# Patient Record
Sex: MALE | Race: WHITE
[De-identification: names, ages, dates, MRNs, and addresses within clinical notes are randomized per-mention and may not be internally consistent; named-entity substitution may affect disease eponyms.]

---

## 2017-06-05 ENCOUNTER — HOSPITAL ENCOUNTER (OUTPATIENT)
Dept: HOSPITAL 74 - FASU-ENDO | Age: 27
Discharge: HOME | End: 2017-06-05
Attending: INTERNAL MEDICINE
Payer: COMMERCIAL

## 2017-06-05 VITALS — TEMPERATURE: 97.8 F

## 2017-06-05 VITALS — BODY MASS INDEX: 26.6 KG/M2

## 2017-06-05 VITALS — SYSTOLIC BLOOD PRESSURE: 110 MMHG | HEART RATE: 64 BPM | DIASTOLIC BLOOD PRESSURE: 64 MMHG

## 2017-06-05 DIAGNOSIS — R10.9: Primary | ICD-10-CM

## 2017-06-05 PROCEDURE — 0DB98ZX EXCISION OF DUODENUM, VIA NATURAL OR ARTIFICIAL OPENING ENDOSCOPIC, DIAGNOSTIC: ICD-10-PCS | Performed by: INTERNAL MEDICINE

## 2017-06-05 PROCEDURE — 0DB68ZX EXCISION OF STOMACH, VIA NATURAL OR ARTIFICIAL OPENING ENDOSCOPIC, DIAGNOSTIC: ICD-10-PCS | Performed by: INTERNAL MEDICINE

## 2017-06-06 NOTE — PATH
Surgical Pathology Report



Patient Name:  CATHERINE WHITEHEAD

Accession #:  

[facility]. Rec. #:  [id number]                                                        

   /Age/Gender:  1990 (Age: 27) / M

Account:  [id number]                                                          

             Location: [facility]-ENDOSCOPY

Taken:  2017

Received:  2017

Reported:  2017

Physicians:  Nima Rendon M.D.

  



Specimen(s) Received

A: BX DUODENUM 

B: BX ANTRUM 





Clinical History

GERD

Abdominal pain, rule out celiac, gastritis







Final Diagnosis

A. DUODENUM, BIOPSY:  

DUODENAL MUCOSA WITH NO PATHOLOGIC FINDINGS.



Note: Features suggestive of celiac disease are not identified in this biopsy.



B. ANTRUM, BIOPSY:  

MODERATE CHRONIC GASTRITIS.

IMMUNOSTAIN IS NEGATIVE FOR H. PYLORI ORGANISMS.





***Electronically Signed***

Destinee Trevino M.D.





Gross Description

A.  Received in formalin, labeled "duodenum" are 3 tan, irregular portions of

soft tissue ranging from 0.1-0.3 cm. in greatest dimension. The specimens are

submitted in toto in one cassette.



B.  Received in formalin, labeled "antrum" are 2 tan, irregular portions of soft

tissue measuring 0.2 and 0.4 cm. in greatest dimension. The specimens are

submitted in toto in one cassette.

## 2022-12-22 PROBLEM — Z00.00 ENCOUNTER FOR PREVENTIVE HEALTH EXAMINATION: Status: ACTIVE | Noted: 2022-12-22

## 2022-12-23 ENCOUNTER — APPOINTMENT (OUTPATIENT)
Dept: PEDIATRIC ORTHOPEDIC SURGERY | Facility: CLINIC | Age: 32
End: 2022-12-23

## 2022-12-23 VITALS — WEIGHT: 164 LBS | TEMPERATURE: 96.9 F | BODY MASS INDEX: 27.32 KG/M2 | HEIGHT: 65 IN

## 2022-12-23 DIAGNOSIS — Z78.9 OTHER SPECIFIED HEALTH STATUS: ICD-10-CM

## 2022-12-23 DIAGNOSIS — Z82.49 FAMILY HISTORY OF ISCHEMIC HEART DISEASE AND OTHER DISEASES OF THE CIRCULATORY SYSTEM: ICD-10-CM

## 2022-12-23 DIAGNOSIS — Z80.9 FAMILY HISTORY OF MALIGNANT NEOPLASM, UNSPECIFIED: ICD-10-CM

## 2022-12-23 DIAGNOSIS — Z83.3 FAMILY HISTORY OF DIABETES MELLITUS: ICD-10-CM

## 2022-12-23 DIAGNOSIS — Z72.89 OTHER PROBLEMS RELATED TO LIFESTYLE: ICD-10-CM

## 2022-12-23 DIAGNOSIS — Z83.79 FAMILY HISTORY OF OTHER DISEASES OF THE DIGESTIVE SYSTEM: ICD-10-CM

## 2022-12-23 PROCEDURE — 73562 X-RAY EXAM OF KNEE 3: CPT | Mod: LT

## 2022-12-23 PROCEDURE — 99202 OFFICE O/P NEW SF 15 MIN: CPT

## 2022-12-23 RX ORDER — MELOXICAM 15 MG/1
15 TABLET ORAL
Qty: 30 | Refills: 1 | Status: ACTIVE | COMMUNITY
Start: 2022-12-23 | End: 1900-01-01

## 2022-12-23 NOTE — ASSESSMENT
[FreeTextEntry1] : Impression: Internal derangement left knee.\par \par He will be treated with Mobic with GI precautions along with formal physical therapy.  Of course in terms of a lot of aggressive soccer is likely to predispose him to reinjury and possibly hasten degenerative changes over time.  I will see him in 6 weeks and follow

## 2022-12-23 NOTE — HISTORY OF PRESENT ILLNESS
[FreeTextEntry1] : This 32-year-old who is 6 years status post left ACL reconstruction and 4 years status post partial left medial meniscectomy is seen today for evaluation of his left knee.  He on occasion will have swelling of the knee very transient maybe 1-2 days.  Usually this follows him playing soccer.  He has not had any gabriel locking buckling or sensation of instability and no limp.  His general health since last seen has been good

## 2022-12-23 NOTE — PHYSICAL EXAM
[FreeTextEntry1] : Exam today reveals normal stance alignment and gait no limp he has full motion to the left hip and left knee.  The left knee has a small effusion only he has no evidence of instability on stress of the collateral ligaments.  He has a 1/3 Lachman negative pivot shift.  Anterior and posterior drawer negative.  He has no significant tenderness to either joint line and no tenderness on patellofemoral grind.  Popliteal fossa calf neurovascular exam are negative.\par \par X-rays ordered and taken today of the left knee reveal minimal degenerative changes.  Good position of his bone tunnels

## 2023-02-03 ENCOUNTER — APPOINTMENT (OUTPATIENT)
Dept: PEDIATRIC ORTHOPEDIC SURGERY | Facility: CLINIC | Age: 33
End: 2023-02-03
Payer: MEDICAID

## 2023-02-03 VITALS
HEIGHT: 65 IN | SYSTOLIC BLOOD PRESSURE: 105 MMHG | TEMPERATURE: 96.8 F | DIASTOLIC BLOOD PRESSURE: 63 MMHG | BODY MASS INDEX: 27.32 KG/M2 | WEIGHT: 164 LBS

## 2023-02-03 DIAGNOSIS — M23.8X2 OTHER INTERNAL DERANGEMENTS OF LEFT KNEE: ICD-10-CM

## 2023-02-03 PROCEDURE — 99212 OFFICE O/P EST SF 10 MIN: CPT

## 2023-02-03 NOTE — ASSESSMENT
[FreeTextEntry1] : Impression: Status post ACL reconstruction left knee.\par \par I have advised a course of formal physical therapy to improve his strength he will return on a as needed basis.  Again I have discussed watching his activity level with regards to aggressive soccer and basketball that is likely to predispose to increasing symptomatology he will return on a as needed basis

## 2023-02-03 NOTE — HISTORY OF PRESENT ILLNESS
[de-identified] : This patient returns she is doing better with regards to his left knee though he still has some discomfort no clicking popping or sensation of instability.  The nonsteroidals he felt were a little bit strong for him.  I have advised that he discontinue them and use Advil on a as needed basis